# Patient Record
Sex: MALE | Race: WHITE | HISPANIC OR LATINO
[De-identification: names, ages, dates, MRNs, and addresses within clinical notes are randomized per-mention and may not be internally consistent; named-entity substitution may affect disease eponyms.]

---

## 2018-07-06 ENCOUNTER — TRANSCRIPTION ENCOUNTER (OUTPATIENT)
Age: 33
End: 2018-07-06

## 2018-07-06 ENCOUNTER — INPATIENT (INPATIENT)
Facility: HOSPITAL | Age: 33
LOS: 1 days | Discharge: HOME | End: 2018-07-08
Attending: SURGERY | Admitting: SURGERY
Payer: COMMERCIAL

## 2018-07-06 VITALS
OXYGEN SATURATION: 100 % | WEIGHT: 145.06 LBS | SYSTOLIC BLOOD PRESSURE: 138 MMHG | HEIGHT: 68 IN | RESPIRATION RATE: 23 BRPM | DIASTOLIC BLOOD PRESSURE: 71 MMHG | TEMPERATURE: 98 F | HEART RATE: 62 BPM

## 2018-07-06 LAB
ALBUMIN SERPL ELPH-MCNC: 4.2 G/DL — SIGNIFICANT CHANGE UP (ref 3.3–5)
ALP SERPL-CCNC: 73 U/L — SIGNIFICANT CHANGE UP (ref 40–120)
ALT FLD-CCNC: 36 U/L — SIGNIFICANT CHANGE UP (ref 12–78)
ANION GAP SERPL CALC-SCNC: 8 MMOL/L — SIGNIFICANT CHANGE UP (ref 5–17)
APPEARANCE UR: CLEAR — SIGNIFICANT CHANGE UP
AST SERPL-CCNC: 18 U/L — SIGNIFICANT CHANGE UP (ref 15–37)
BASOPHILS # BLD AUTO: 0.03 K/UL — SIGNIFICANT CHANGE UP (ref 0–0.2)
BASOPHILS NFR BLD AUTO: 0.2 % — SIGNIFICANT CHANGE UP (ref 0–2)
BILIRUB SERPL-MCNC: 0.3 MG/DL — SIGNIFICANT CHANGE UP (ref 0.2–1.2)
BILIRUB UR-MCNC: NEGATIVE — SIGNIFICANT CHANGE UP
BUN SERPL-MCNC: 15 MG/DL — SIGNIFICANT CHANGE UP (ref 7–23)
CALCIUM SERPL-MCNC: 9.1 MG/DL — SIGNIFICANT CHANGE UP (ref 8.5–10.1)
CHLORIDE SERPL-SCNC: 103 MMOL/L — SIGNIFICANT CHANGE UP (ref 96–108)
CO2 SERPL-SCNC: 28 MMOL/L — SIGNIFICANT CHANGE UP (ref 22–31)
COLOR SPEC: YELLOW — SIGNIFICANT CHANGE UP
CREAT SERPL-MCNC: 1.16 MG/DL — SIGNIFICANT CHANGE UP (ref 0.5–1.3)
DIFF PNL FLD: NEGATIVE — SIGNIFICANT CHANGE UP
EOSINOPHIL # BLD AUTO: 0.07 K/UL — SIGNIFICANT CHANGE UP (ref 0–0.5)
EOSINOPHIL NFR BLD AUTO: 0.5 % — SIGNIFICANT CHANGE UP (ref 0–6)
GLUCOSE SERPL-MCNC: 97 MG/DL — SIGNIFICANT CHANGE UP (ref 70–99)
GLUCOSE UR QL: NEGATIVE MG/DL — SIGNIFICANT CHANGE UP
HCT VFR BLD CALC: 44.9 % — SIGNIFICANT CHANGE UP (ref 39–50)
HGB BLD-MCNC: 15 G/DL — SIGNIFICANT CHANGE UP (ref 13–17)
IMM GRANULOCYTES NFR BLD AUTO: 0.3 % — SIGNIFICANT CHANGE UP (ref 0–1.5)
KETONES UR-MCNC: NEGATIVE — SIGNIFICANT CHANGE UP
LEUKOCYTE ESTERASE UR-ACNC: NEGATIVE — SIGNIFICANT CHANGE UP
LIDOCAIN IGE QN: 166 U/L — SIGNIFICANT CHANGE UP (ref 73–393)
LYMPHOCYTES # BLD AUTO: 23 % — SIGNIFICANT CHANGE UP (ref 13–44)
LYMPHOCYTES # BLD AUTO: 3.19 K/UL — SIGNIFICANT CHANGE UP (ref 1–3.3)
MCHC RBC-ENTMCNC: 30.2 PG — SIGNIFICANT CHANGE UP (ref 27–34)
MCHC RBC-ENTMCNC: 33.4 GM/DL — SIGNIFICANT CHANGE UP (ref 32–36)
MCV RBC AUTO: 90.3 FL — SIGNIFICANT CHANGE UP (ref 80–100)
MONOCYTES # BLD AUTO: 0.78 K/UL — SIGNIFICANT CHANGE UP (ref 0–0.9)
MONOCYTES NFR BLD AUTO: 5.6 % — SIGNIFICANT CHANGE UP (ref 2–14)
NEUTROPHILS # BLD AUTO: 9.75 K/UL — HIGH (ref 1.8–7.4)
NEUTROPHILS NFR BLD AUTO: 70.4 % — SIGNIFICANT CHANGE UP (ref 43–77)
NITRITE UR-MCNC: NEGATIVE — SIGNIFICANT CHANGE UP
NRBC # BLD: 0 /100 WBCS — SIGNIFICANT CHANGE UP (ref 0–0)
PH UR: 7 — SIGNIFICANT CHANGE UP (ref 5–8)
PLATELET # BLD AUTO: 232 K/UL — SIGNIFICANT CHANGE UP (ref 150–400)
POTASSIUM SERPL-MCNC: 4.3 MMOL/L — SIGNIFICANT CHANGE UP (ref 3.5–5.3)
POTASSIUM SERPL-SCNC: 4.3 MMOL/L — SIGNIFICANT CHANGE UP (ref 3.5–5.3)
PROT SERPL-MCNC: 8 GM/DL — SIGNIFICANT CHANGE UP (ref 6–8.3)
PROT UR-MCNC: 15 MG/DL
RBC # BLD: 4.97 M/UL — SIGNIFICANT CHANGE UP (ref 4.2–5.8)
RBC # FLD: 12.2 % — SIGNIFICANT CHANGE UP (ref 10.3–14.5)
SODIUM SERPL-SCNC: 139 MMOL/L — SIGNIFICANT CHANGE UP (ref 135–145)
SP GR SPEC: 1.02 — SIGNIFICANT CHANGE UP (ref 1.01–1.02)
UROBILINOGEN FLD QL: NEGATIVE MG/DL — SIGNIFICANT CHANGE UP
WBC # BLD: 13.86 K/UL — HIGH (ref 3.8–10.5)
WBC # FLD AUTO: 13.86 K/UL — HIGH (ref 3.8–10.5)

## 2018-07-06 PROCEDURE — 99285 EMERGENCY DEPT VISIT HI MDM: CPT

## 2018-07-06 RX ORDER — SODIUM CHLORIDE 9 MG/ML
1000 INJECTION INTRAMUSCULAR; INTRAVENOUS; SUBCUTANEOUS ONCE
Qty: 0 | Refills: 0 | Status: COMPLETED | OUTPATIENT
Start: 2018-07-06 | End: 2018-07-06

## 2018-07-06 RX ORDER — IOHEXOL 300 MG/ML
30 INJECTION, SOLUTION INTRAVENOUS ONCE
Qty: 0 | Refills: 0 | Status: COMPLETED | OUTPATIENT
Start: 2018-07-06 | End: 2018-07-06

## 2018-07-06 RX ORDER — MORPHINE SULFATE 50 MG/1
4 CAPSULE, EXTENDED RELEASE ORAL ONCE
Qty: 0 | Refills: 0 | Status: DISCONTINUED | OUTPATIENT
Start: 2018-07-06 | End: 2018-07-06

## 2018-07-06 RX ADMIN — MORPHINE SULFATE 4 MILLIGRAM(S): 50 CAPSULE, EXTENDED RELEASE ORAL at 22:05

## 2018-07-06 RX ADMIN — IOHEXOL 30 MILLILITER(S): 300 INJECTION, SOLUTION INTRAVENOUS at 22:08

## 2018-07-06 RX ADMIN — MORPHINE SULFATE 4 MILLIGRAM(S): 50 CAPSULE, EXTENDED RELEASE ORAL at 23:14

## 2018-07-06 RX ADMIN — SODIUM CHLORIDE 1000 MILLILITER(S): 9 INJECTION INTRAMUSCULAR; INTRAVENOUS; SUBCUTANEOUS at 22:08

## 2018-07-06 NOTE — ED PROVIDER NOTE - OBJECTIVE STATEMENT
Pt is a 34 yo gentleman with no significant past medical history who presents to the ED with abdominal pain starting 45 min prior to arrival. No n/v/d, no chest pain, no sob. Pain is constant. Localized to RLQ. No radiation, no testicular pain, no dysuria, no flank pain. No hx of kidney stones, no hx of abd surgeries.

## 2018-07-06 NOTE — ED PROVIDER NOTE - MEDICAL DECISION MAKING DETAILS
Ddx: Appendicitis/ Kidney stone/ no testicular pain to suggest torsion  Plan: cbc, cmp, pain control, ct abd, reassess

## 2018-07-06 NOTE — ED ADULT TRIAGE NOTE - CHIEF COMPLAINT QUOTE
Pt is here for right lower quadrant pain. Pt states that the pain was sudden in onset and started about 45 minutes ago.

## 2018-07-07 ENCOUNTER — RESULT REVIEW (OUTPATIENT)
Age: 33
End: 2018-07-07

## 2018-07-07 DIAGNOSIS — K35.80 UNSPECIFIED ACUTE APPENDICITIS: ICD-10-CM

## 2018-07-07 LAB
APTT BLD: 27.7 SEC — SIGNIFICANT CHANGE UP (ref 27.5–37.4)
INR BLD: 1.2 RATIO — HIGH (ref 0.88–1.16)
PROTHROM AB SERPL-ACNC: 13.1 SEC — HIGH (ref 9.8–12.7)
WBC UR QL: SIGNIFICANT CHANGE UP

## 2018-07-07 PROCEDURE — 88304 TISSUE EXAM BY PATHOLOGIST: CPT | Mod: 26

## 2018-07-07 PROCEDURE — 44970 LAPAROSCOPY APPENDECTOMY: CPT | Mod: AS

## 2018-07-07 PROCEDURE — 74177 CT ABD & PELVIS W/CONTRAST: CPT | Mod: 26

## 2018-07-07 RX ORDER — ACETAMINOPHEN 500 MG
1000 TABLET ORAL ONCE
Qty: 0 | Refills: 0 | Status: COMPLETED | OUTPATIENT
Start: 2018-07-07 | End: 2018-07-07

## 2018-07-07 RX ORDER — HYDROMORPHONE HYDROCHLORIDE 2 MG/ML
1 INJECTION INTRAMUSCULAR; INTRAVENOUS; SUBCUTANEOUS
Qty: 0 | Refills: 0 | Status: DISCONTINUED | OUTPATIENT
Start: 2018-07-07 | End: 2018-07-08

## 2018-07-07 RX ORDER — CEFOTETAN DISODIUM 1 G
VIAL (EA) INJECTION
Qty: 0 | Refills: 0 | Status: DISCONTINUED | OUTPATIENT
Start: 2018-07-07 | End: 2018-07-07

## 2018-07-07 RX ORDER — SODIUM CHLORIDE 9 MG/ML
1000 INJECTION, SOLUTION INTRAVENOUS
Qty: 0 | Refills: 0 | Status: DISCONTINUED | OUTPATIENT
Start: 2018-07-07 | End: 2018-07-08

## 2018-07-07 RX ORDER — CEFOTETAN DISODIUM 1 G
2 VIAL (EA) INJECTION EVERY 12 HOURS
Qty: 0 | Refills: 0 | Status: COMPLETED | OUTPATIENT
Start: 2018-07-07 | End: 2018-07-07

## 2018-07-07 RX ORDER — ONDANSETRON 8 MG/1
4 TABLET, FILM COATED ORAL EVERY 6 HOURS
Qty: 0 | Refills: 0 | Status: DISCONTINUED | OUTPATIENT
Start: 2018-07-07 | End: 2018-07-07

## 2018-07-07 RX ORDER — HEPARIN SODIUM 5000 [USP'U]/ML
5000 INJECTION INTRAVENOUS; SUBCUTANEOUS EVERY 12 HOURS
Qty: 0 | Refills: 0 | Status: DISCONTINUED | OUTPATIENT
Start: 2018-07-07 | End: 2018-07-08

## 2018-07-07 RX ORDER — CEFOTETAN DISODIUM 1 G
2 VIAL (EA) INJECTION EVERY 12 HOURS
Qty: 0 | Refills: 0 | Status: DISCONTINUED | OUTPATIENT
Start: 2018-07-07 | End: 2018-07-07

## 2018-07-07 RX ORDER — OXYCODONE AND ACETAMINOPHEN 5; 325 MG/1; MG/1
2 TABLET ORAL EVERY 4 HOURS
Qty: 0 | Refills: 0 | Status: DISCONTINUED | OUTPATIENT
Start: 2018-07-07 | End: 2018-07-08

## 2018-07-07 RX ORDER — MORPHINE SULFATE 50 MG/1
4 CAPSULE, EXTENDED RELEASE ORAL EVERY 4 HOURS
Qty: 0 | Refills: 0 | Status: DISCONTINUED | OUTPATIENT
Start: 2018-07-07 | End: 2018-07-07

## 2018-07-07 RX ORDER — HEPARIN SODIUM 5000 [USP'U]/ML
5000 INJECTION INTRAVENOUS; SUBCUTANEOUS EVERY 12 HOURS
Qty: 0 | Refills: 0 | Status: DISCONTINUED | OUTPATIENT
Start: 2018-07-07 | End: 2018-07-07

## 2018-07-07 RX ORDER — FENTANYL CITRATE 50 UG/ML
50 INJECTION INTRAVENOUS
Qty: 0 | Refills: 0 | Status: DISCONTINUED | OUTPATIENT
Start: 2018-07-07 | End: 2018-07-08

## 2018-07-07 RX ORDER — CEFOTETAN DISODIUM 1 G
2 VIAL (EA) INJECTION ONCE
Qty: 0 | Refills: 0 | Status: COMPLETED | OUTPATIENT
Start: 2018-07-07 | End: 2018-07-07

## 2018-07-07 RX ORDER — MORPHINE SULFATE 50 MG/1
2 CAPSULE, EXTENDED RELEASE ORAL EVERY 4 HOURS
Qty: 0 | Refills: 0 | Status: DISCONTINUED | OUTPATIENT
Start: 2018-07-07 | End: 2018-07-08

## 2018-07-07 RX ORDER — SODIUM CHLORIDE 9 MG/ML
1000 INJECTION, SOLUTION INTRAVENOUS
Qty: 0 | Refills: 0 | Status: DISCONTINUED | OUTPATIENT
Start: 2018-07-07 | End: 2018-07-07

## 2018-07-07 RX ORDER — ACETAMINOPHEN 500 MG
650 TABLET ORAL EVERY 6 HOURS
Qty: 0 | Refills: 0 | Status: DISCONTINUED | OUTPATIENT
Start: 2018-07-07 | End: 2018-07-07

## 2018-07-07 RX ADMIN — HEPARIN SODIUM 5000 UNIT(S): 5000 INJECTION INTRAVENOUS; SUBCUTANEOUS at 18:52

## 2018-07-07 RX ADMIN — SODIUM CHLORIDE 125 MILLILITER(S): 9 INJECTION, SOLUTION INTRAVENOUS at 03:33

## 2018-07-07 RX ADMIN — Medication 100 GRAM(S): at 02:58

## 2018-07-07 RX ADMIN — SODIUM CHLORIDE 100 MILLILITER(S): 9 INJECTION, SOLUTION INTRAVENOUS at 11:50

## 2018-07-07 RX ADMIN — Medication 1000 MILLIGRAM(S): at 12:00

## 2018-07-07 RX ADMIN — Medication 400 MILLIGRAM(S): at 11:40

## 2018-07-07 RX ADMIN — Medication 50 GRAM(S): at 18:51

## 2018-07-07 NOTE — H&P ADULT - NSHPREVIEWOFSYSTEMS_GEN_ALL_CORE
· CONSTITUTIONAL: no fever and no chills.  · EYES: no discharge, no irritation, no pain, no redness, and no visual changes.  · ENMT: Ears: no ear pain and no hearing problems. Nose: no nasal congestion and no nasal drainage. Mouth/Throat: no dysphagia, no hoarseness and no throat pain. Neck: no lumps, no pain, no stiffness and no swollen glands.  · CARDIOVASCULAR: no chest pain and no edema.  · RESPIRATORY: no chest pain, no cough, and no shortness of breath.  · GASTROINTESTINAL: - - -  · Gastrointestinal [+]: ABDOMINAL PAIN  · MUSCULOSKELETAL: no back pain, no gout, no musculoskeletal pain, no neck pain, and no weakness.  · SKIN: no abrasions, no jaundice, no lesions, no pruritis, and no rashes.  · NEURO: no loss of consciousness, no gait abnormality, no headache, no sensory deficits, and no weakness.  · PSYCHIATRIC: no known mental health issues.

## 2018-07-07 NOTE — H&P ADULT - NSHPPHYSICALEXAM_GEN_ALL_CORE
· CONSTITUTIONAL: Well appearing, well nourished, awake, alert, oriented to person, place, time/situation and in no apparent distress.  · ENMT: Airway patent, Nasal mucosa clear. Mouth with normal mucosa. Throat has no vesicles, no oropharyngeal exudates and uvula is midline.  · EYES: Clear bilaterally, pupils equal, round and reactive to light.  · CARDIAC: Normal rate, regular rhythm.  Heart sounds S1, S2.  No murmurs, rubs or gallops.  · RESPIRATORY: Breath sounds clear and equal bilaterally.  · ABDOMEN:L: ND, +BS, Mcburney point tenderness, +guarding, Positive Rovsing sign, +rebound  · GENITOURINARY: No discharge, lesions. No testicular pain  · SKIN: Skin normal color for race, warm, dry and intact. No evidence of rash.

## 2018-07-07 NOTE — H&P ADULT - HISTORY OF PRESENT ILLNESS
34y/o male with no significant PMH or PSH presents to ER with c/o periumbilical pain that started last evening. Pt states pain is 10/10, sudden onset, started periumbilical and has now localized to RLQ. Denies  n/v/d, no chest pain, no sob,  no testicular pain, no dysuria, no flank pain. No hx of kidney stones, no hx of abd surgeries. Last Bm was yesterday morning, last meal was yesterday lunch.

## 2018-07-07 NOTE — H&P ADULT - NSHPSOCIALHISTORY_GEN_ALL_CORE
Marries, lives with spouse and children, , denies smoking, alcohol socially, no other recreational drug use.

## 2018-07-07 NOTE — BRIEF OPERATIVE NOTE - PROCEDURE
<<-----Click on this checkbox to enter Procedure Laparoscopic appendectomy  07/07/2018    Active  SHRADDHA

## 2018-07-08 ENCOUNTER — TRANSCRIPTION ENCOUNTER (OUTPATIENT)
Age: 33
End: 2018-07-08

## 2018-07-08 VITALS
DIASTOLIC BLOOD PRESSURE: 72 MMHG | SYSTOLIC BLOOD PRESSURE: 113 MMHG | HEART RATE: 78 BPM | OXYGEN SATURATION: 95 % | RESPIRATION RATE: 16 BRPM | TEMPERATURE: 98 F

## 2018-07-08 LAB
ANION GAP SERPL CALC-SCNC: 5 MMOL/L — SIGNIFICANT CHANGE UP (ref 5–17)
BUN SERPL-MCNC: 12 MG/DL — SIGNIFICANT CHANGE UP (ref 7–23)
CALCIUM SERPL-MCNC: 8.4 MG/DL — LOW (ref 8.5–10.1)
CHLORIDE SERPL-SCNC: 106 MMOL/L — SIGNIFICANT CHANGE UP (ref 96–108)
CO2 SERPL-SCNC: 31 MMOL/L — SIGNIFICANT CHANGE UP (ref 22–31)
CREAT SERPL-MCNC: 1.05 MG/DL — SIGNIFICANT CHANGE UP (ref 0.5–1.3)
CULTURE RESULTS: SIGNIFICANT CHANGE UP
GLUCOSE SERPL-MCNC: 90 MG/DL — SIGNIFICANT CHANGE UP (ref 70–99)
HCT VFR BLD CALC: 41.4 % — SIGNIFICANT CHANGE UP (ref 39–50)
HGB BLD-MCNC: 13.8 G/DL — SIGNIFICANT CHANGE UP (ref 13–17)
MCHC RBC-ENTMCNC: 30.7 PG — SIGNIFICANT CHANGE UP (ref 27–34)
MCHC RBC-ENTMCNC: 33.3 GM/DL — SIGNIFICANT CHANGE UP (ref 32–36)
MCV RBC AUTO: 92 FL — SIGNIFICANT CHANGE UP (ref 80–100)
NRBC # BLD: 0 /100 WBCS — SIGNIFICANT CHANGE UP (ref 0–0)
PLATELET # BLD AUTO: 208 K/UL — SIGNIFICANT CHANGE UP (ref 150–400)
POTASSIUM SERPL-MCNC: 4.3 MMOL/L — SIGNIFICANT CHANGE UP (ref 3.5–5.3)
POTASSIUM SERPL-SCNC: 4.3 MMOL/L — SIGNIFICANT CHANGE UP (ref 3.5–5.3)
RBC # BLD: 4.5 M/UL — SIGNIFICANT CHANGE UP (ref 4.2–5.8)
RBC # FLD: 12.4 % — SIGNIFICANT CHANGE UP (ref 10.3–14.5)
SODIUM SERPL-SCNC: 142 MMOL/L — SIGNIFICANT CHANGE UP (ref 135–145)
SPECIMEN SOURCE: SIGNIFICANT CHANGE UP
WBC # BLD: 11.8 K/UL — HIGH (ref 3.8–10.5)
WBC # FLD AUTO: 11.8 K/UL — HIGH (ref 3.8–10.5)

## 2018-07-08 RX ORDER — DOCUSATE SODIUM 100 MG
1 CAPSULE ORAL
Qty: 20 | Refills: 0 | OUTPATIENT
Start: 2018-07-08 | End: 2018-07-17

## 2018-07-08 NOTE — DISCHARGE NOTE ADULT - HOSPITAL COURSE
33M admitted with acute appendicitis. Underwent a laparoscopic appendectomy on 7/7/18, no acute intraop event, patient tolerated procedure well. Patient did well postoperatively, tolerated advancement of diet, pain adequately controlled, and patient is voiding and ambulating without difficulty. Labs and vitals remain stable, hospital course uneventful.   Patient was discharged in stable condition on POD #1 to follow up with surgeon in 1-2 weeks.

## 2018-07-08 NOTE — DISCHARGE NOTE ADULT - PATIENT PORTAL LINK FT
You can access the [facility] Patient Portal, offered by [facility], by registering with the following website: [email or web address]

## 2018-07-08 NOTE — DISCHARGE NOTE ADULT - CARE PLAN
Principal Discharge DX:	Acute appendicitis, unspecified acute appendicitis type  Goal:	recover from surgery  Assessment and plan of treatment:	OK to shower, allow water to run over area and pat dry, leave steri strips in place and allow them fall off by themselves. May use OTC motrin or tylenol as needed for mild-moderate pain, or prescribed pain medication as directed. Do not drive while using prescribed narcotic pain meds. May use stool softener while taking prescribed pain medication to prevent constipation. Drink plenty of fluids, activity as tolerated, do not lift anything greater than 10lbs, and rest as needed. Please call the office to make an appointment for follow up with Dr. Barger in 10-14 days. Call MD sooner with any questions, concerns/problems.

## 2018-07-08 NOTE — CHART NOTE - NSCHARTNOTEFT_GEN_A_CORE
To Whom this may concern,    Mr. Michael Martinez was admitted to our facility on 7/6/18 and discharged on 7/8/18. Patient will be out of work for the next 7 days, may return to work once cleared by physician.    Do not hesitate to call with any questions.  Thank you in advance,    Emily Phillips PA-C  Department of Surgery  [facility]

## 2018-07-08 NOTE — DISCHARGE NOTE ADULT - CARE PROVIDER_API CALL
Maria C Barger), Surgery  [address] [facility]  [address] [place] [address]  Phone: [phone]  Fax: [phone]

## 2018-07-08 NOTE — DISCHARGE NOTE ADULT - NS AS ACTIVITY OBS
Stairs allowed/No Heavy lifting/straining/Showering allowed/Walking-Outdoors allowed/Do not drive or operate machinery/Walking-Indoors allowed

## 2018-07-08 NOTE — DISCHARGE NOTE ADULT - MEDICATION SUMMARY - MEDICATIONS TO TAKE
I will START or STAY ON the medications listed below when I get home from the hospital:    oxyCODONE-acetaminophen 5 mg-325 mg oral tablet  -- 1 tab(s) by mouth every 6 hours, As Needed -for moderate pain MDD:4   -- Caution federal law prohibits the transfer of this drug to any person other  than the person for whom it was prescribed.  May cause drowsiness.  Alcohol may intensify this effect.  Use care when operating dangerous machinery.  This prescription cannot be refilled.  This product contains acetaminophen.  Do not use  with any other product containing acetaminophen to prevent possible liver damage.  Using more of this medication than prescribed may cause serious breathing problems.    -- Indication: For postop pain    Colace 100 mg oral capsule  -- 1 cap(s) by mouth 2 times a day, As Needed -for constipation   -- Medication should be taken with plenty of water.    -- Indication: For contispation

## 2018-07-08 NOTE — PROGRESS NOTE ADULT - SUBJECTIVE AND OBJECTIVE BOX
Postoperative Day #: 1  Patient seen and examined at bedside resting comfortably. No acute issues overnight, Afebrile.   No complaints offered. No BM, +flatus. Voided without issues. Uses IS   Abdominal pain is well controlled.  Denies nausea and vomiting. Tolerating diet.  Denies chest pain, dyspnea, cough.    T(F): 98.5 (07-08-18 @ 03:45), Max: 98.8 (07-07-18 @ 09:41)  HR: 61 (07-08-18 @ 03:45) (55 - 83)  BP: 105/52 (07-08-18 @ 03:45) (95/52 - 127/86)  RR: 16 (07-08-18 @ 03:45) (12 - 18)  SpO2: 98% (07-08-18 @ 03:45) (97% - 100%)  Wt(kg): --  CAPILLARY BLOOD GLUCOSE      PHYSICAL EXAM:  General: NAD, WDWN  Neuro:  Alert & oriented x 3  HEENT: NCAT, EOMI, conjunctiva clear  CV: +S1+S2 regular rate and rhythm  Lung: clear to ausculation bilaterally, respirations nonlabored, good inspiratory effort  Abdomen: soft, mildly ND. Normoactive BS, +incisional tenderness, incisions with dressing c/d/i.  Extremities: no pedal edema or calf tenderness noted     LABS: Pending                  07 Jul 2018 07:01  -  08 Jul 2018 06:13  --------------------------------------------------------  IN:    Oral Fluid: 780 mL  Total IN: 780 mL    OUT:  Total OUT: 0 mL    Total NET: 780 mL            Impression: 33y Male S/P lap Appy -POD#1 Stable      Plan:  - Advance diet as tolerated  - F/U am labs  -continue VTE prophylaxis   -Increase activity with PT, OOB, Ambulate  -Cont incentive spirometry use  -continue local wound care  - possible d/c home in PM  -will discuss with surgical attending

## 2018-07-08 NOTE — DISCHARGE NOTE ADULT - PLAN OF CARE
recover from surgery OK to shower, allow water to run over area and pat dry, leave steri strips in place and allow them fall off by themselves. May use OTC motrin or tylenol as needed for mild-moderate pain, or prescribed pain medication as directed. Do not drive while using prescribed narcotic pain meds. May use stool softener while taking prescribed pain medication to prevent constipation. Drink plenty of fluids, activity as tolerated, do not lift anything greater than 10lbs, and rest as needed. Please call the office to make an appointment for follow up with Dr. Barger in 10-14 days. Call MD sooner with any questions, concerns/problems.

## 2018-07-10 LAB — SURGICAL PATHOLOGY FINAL REPORT - CH: SIGNIFICANT CHANGE UP

## 2018-07-16 DIAGNOSIS — K35.80 UNSPECIFIED ACUTE APPENDICITIS: ICD-10-CM

## 2018-07-16 DIAGNOSIS — K59.00 CONSTIPATION, UNSPECIFIED: ICD-10-CM

## 2018-07-16 DIAGNOSIS — R10.9 UNSPECIFIED ABDOMINAL PAIN: ICD-10-CM

## 2019-02-11 PROBLEM — Z00.00 ENCOUNTER FOR PREVENTIVE HEALTH EXAMINATION: Status: ACTIVE | Noted: 2019-02-11

## 2019-02-28 ENCOUNTER — APPOINTMENT (OUTPATIENT)
Dept: UROLOGY | Facility: CLINIC | Age: 34
End: 2019-02-28

## 2019-03-11 ENCOUNTER — APPOINTMENT (OUTPATIENT)
Dept: UROLOGY | Facility: CLINIC | Age: 34
End: 2019-03-11
Payer: COMMERCIAL

## 2019-03-11 VITALS
HEART RATE: 62 BPM | SYSTOLIC BLOOD PRESSURE: 109 MMHG | TEMPERATURE: 97.8 F | WEIGHT: 160 LBS | DIASTOLIC BLOOD PRESSURE: 71 MMHG | BODY MASS INDEX: 24.25 KG/M2 | HEIGHT: 68 IN | RESPIRATION RATE: 16 BRPM

## 2019-03-11 DIAGNOSIS — R31.0 GROSS HEMATURIA: ICD-10-CM

## 2019-03-11 DIAGNOSIS — R39.9 UNSPECIFIED SYMPTOMS AND SIGNS INVOLVING THE GENITOURINARY SYSTEM: ICD-10-CM

## 2019-03-11 DIAGNOSIS — Z12.5 ENCOUNTER FOR SCREENING FOR MALIGNANT NEOPLASM OF PROSTATE: ICD-10-CM

## 2019-03-11 LAB
ALBUMIN SERPL ELPH-MCNC: 4.5 G/DL
ALP BLD-CCNC: 56 U/L
ALT SERPL-CCNC: 22 U/L
ANION GAP SERPL CALC-SCNC: 13 MMOL/L
APPEARANCE: ABNORMAL
AST SERPL-CCNC: 17 U/L
BACTERIA: NEGATIVE
BASOPHILS # BLD AUTO: 0.03 K/UL
BASOPHILS NFR BLD AUTO: 0.8 %
BILIRUB SERPL-MCNC: 0.3 MG/DL
BILIRUBIN URINE: NEGATIVE
BLOOD URINE: NEGATIVE
BUN SERPL-MCNC: 12 MG/DL
CALCIUM SERPL-MCNC: 9.6 MG/DL
CHLORIDE SERPL-SCNC: 102 MMOL/L
CO2 SERPL-SCNC: 26 MMOL/L
COLOR: YELLOW
CREAT SERPL-MCNC: 0.95 MG/DL
EOSINOPHIL # BLD AUTO: 0.05 K/UL
EOSINOPHIL NFR BLD AUTO: 1.3 %
GLUCOSE QUALITATIVE U: NEGATIVE
GLUCOSE SERPL-MCNC: 87 MG/DL
HCT VFR BLD CALC: 46.2 %
HGB BLD-MCNC: 14.7 G/DL
HYALINE CASTS: 2 /LPF
IMM GRANULOCYTES NFR BLD AUTO: 0.5 %
KETONES URINE: NEGATIVE
LEUKOCYTE ESTERASE URINE: NEGATIVE
LYMPHOCYTES # BLD AUTO: 1.73 K/UL
LYMPHOCYTES NFR BLD AUTO: 43.4 %
MAN DIFF?: NORMAL
MCHC RBC-ENTMCNC: 30.1 PG
MCHC RBC-ENTMCNC: 31.8 GM/DL
MCV RBC AUTO: 94.7 FL
MICROSCOPIC-UA: NORMAL
MONOCYTES # BLD AUTO: 0.29 K/UL
MONOCYTES NFR BLD AUTO: 7.3 %
NEUTROPHILS # BLD AUTO: 1.87 K/UL
NEUTROPHILS NFR BLD AUTO: 46.7 %
NITRITE URINE: NEGATIVE
PH URINE: 8
PLATELET # BLD AUTO: 236 K/UL
POTASSIUM SERPL-SCNC: 4.6 MMOL/L
PROT SERPL-MCNC: 7.1 G/DL
PROTEIN URINE: NORMAL
PSA SERPL-MCNC: 0.43 NG/ML
RBC # BLD: 4.88 M/UL
RBC # FLD: 12 %
RED BLOOD CELLS URINE: 4 /HPF
SODIUM SERPL-SCNC: 141 MMOL/L
SPECIFIC GRAVITY URINE: 1.02
SQUAMOUS EPITHELIAL CELLS: 2 /HPF
TESTOST SERPL-MCNC: 792 NG/DL
UROBILINOGEN URINE: NORMAL
WBC # FLD AUTO: 3.99 K/UL
WHITE BLOOD CELLS URINE: 2 /HPF

## 2019-03-11 PROCEDURE — 99244 OFF/OP CNSLTJ NEW/EST MOD 40: CPT

## 2019-03-11 NOTE — ADDENDUM
[FreeTextEntry1] : This note was authored by Kori Florian working as a scribe for Dr. Troy Paz.\par I, Dr. Troy Paz, have reviewed the content of this note and confirm it is true and accurate. I personally performed the history and physical examination and made all the decisions.\par 3/11/19\par

## 2019-03-11 NOTE — PHYSICAL EXAM
[General Appearance - Well Developed] : well developed [General Appearance - Well Nourished] : well nourished [Normal Appearance] : normal appearance [Well Groomed] : well groomed [General Appearance - In No Acute Distress] : no acute distress [Heart Rate And Rhythm] : Heart rate and rhythm were normal [Edema] : no peripheral edema [Respiration, Rhythm And Depth] : normal respiratory rhythm and effort [Exaggerated Use Of Accessory Muscles For Inspiration] : no accessory muscle use [Abdomen Soft] : soft [Abdomen Tenderness] : non-tender [Costovertebral Angle Tenderness] : no ~M costovertebral angle tenderness [Normal Station and Gait] : the gait and station were normal for the patient's age [Skin Color & Pigmentation] : normal skin color and pigmentation [Skin Turgor] : supple [] : no rash [No Focal Deficits] : no focal deficits [Oriented To Time, Place, And Person] : oriented to person, place, and time [Affect] : the affect was normal [Mood] : the mood was normal [Not Anxious] : not anxious [No Palpable Adenopathy] : no palpable adenopathy [Cervical Lymph Nodes Enlarged Posterior Bilaterally] : posterior cervical [Cervical Lymph Nodes Enlarged Anterior Bilaterally] : anterior cervical [Supraclavicular Lymph Nodes Enlarged Bilaterally] : supraclavicular [Urethral Meatus] : meatus normal [Penis Abnormality] : normal circumcised penis [Testes Tenderness] : no tenderness of the testes [Testes Mass (___cm)] : there were no testicular masses [FreeTextEntry1] : .

## 2019-03-11 NOTE — HISTORY OF PRESENT ILLNESS
[FreeTextEntry1] : Mr. Martinez is a 34 year old male presenting with blood from tip of penis after sex. Notes that he had sex with his wife one week ago. When he went into the shower after sex, he saw blood come out of his penis. Denies hematospermia and gross hematuria. Denies pain when he sits. Was given an antibiotic by his PCP just in case he had an infection. Notes that this was the only episode where he had blood from his penis after sex. He has had sex since that episode without any issues. Patient denies dysuria, pushing or straining, urgency, or incontinence. Doesn't wake up during the night to urinate. Denies personal history of kidney stones, tobacco use, asthma and diabetes. Denies family history of kidney stones and prostate cancer. Had a hydrocele when he was a child. Had an appendicitis in July 2018. NKDA. . , has two kids and trying to have another.

## 2019-03-11 NOTE — ASSESSMENT
[FreeTextEntry1] : Mr. Martinez is a 34 year old male presenting with blood from tip of penis after sex. Notes that he had sex with his wife one week ago. When he went into the shower after sex, he saw blood come out of his penis. Denies hematospermia and gross hematuria. Denies pain when he sits. Was given an antibiotic by his PCP just in case he had an infection. Notes that this was the only episode where he had blood from his penis after sex. He has had sex since that episode without any issues. Patient denies dysuria, pushing or straining, urgency, or incontinence. Doesn't wake up during the night to urinate. Denies personal history of kidney stones, tobacco use, asthma and diabetes. Denies family history of kidney stones and prostate cancer. Had a hydrocele when he was a child. Had an appendicitis in July 2018. NKDA. . , has two kids and trying to have another.\par \par The patient produced a urine sample which will be sent for urinalysis, urine cytology, and urine culture.\par Blood work today included comprehensive metabolic panel, CBC, PSA, and testosterone.\par \par The patient will undergo a MRI pelvis.\par \par The patient will return one week after the MRI for a follow up.

## 2019-03-11 NOTE — REVIEW OF SYSTEMS
[Negative] : Heme/Lymph [Feeling Poorly] : not feeling poorly [Eyesight Problems] : no eyesight problems [Loss Of Hearing] : no hearing loss [Nosebleeds] : no nosebleeds [Chest Pain] : no chest pain [Cough] : no cough [Constipation] : no constipation [Dysuria] : no dysuria [Incontinence] : no incontinence [Nocturia] : no nocturia [Arthralgias] : no arthralgias [Joint Pain] : no joint pain [Skin Lesions] : no skin lesions [Skin Wound] : no skin wound [Convulsions] : no convulsions [Anxiety] : no anxiety [Depression] : no depression [Erectile Dysfunction] : no erectile dysfunction [Easy Bleeding] : no tendency for easy bleeding [Easy Bruising] : no tendency for easy bruising

## 2019-03-12 ENCOUNTER — OTHER (OUTPATIENT)
Age: 34
End: 2019-03-12

## 2019-03-12 LAB
BACTERIA UR CULT: NORMAL
URINE CYTOLOGY: NORMAL

## 2019-03-18 ENCOUNTER — OTHER (OUTPATIENT)
Age: 34
End: 2019-03-18

## 2019-03-21 ENCOUNTER — APPOINTMENT (OUTPATIENT)
Dept: MRI IMAGING | Facility: IMAGING CENTER | Age: 34
End: 2019-03-21

## 2019-04-04 ENCOUNTER — APPOINTMENT (OUTPATIENT)
Dept: UROLOGY | Facility: CLINIC | Age: 34
End: 2019-04-04
Payer: COMMERCIAL

## 2019-04-04 PROCEDURE — 99214 OFFICE O/P EST MOD 30 MIN: CPT

## 2020-09-01 ENCOUNTER — EMERGENCY (EMERGENCY)
Facility: HOSPITAL | Age: 35
LOS: 0 days | Discharge: HOME | End: 2020-09-01
Attending: EMERGENCY MEDICINE
Payer: COMMERCIAL

## 2020-09-01 VITALS
TEMPERATURE: 98 F | OXYGEN SATURATION: 100 % | RESPIRATION RATE: 16 BRPM | SYSTOLIC BLOOD PRESSURE: 95 MMHG | DIASTOLIC BLOOD PRESSURE: 65 MMHG | HEART RATE: 72 BPM

## 2020-09-01 VITALS
HEIGHT: 68 IN | TEMPERATURE: 98 F | SYSTOLIC BLOOD PRESSURE: 114 MMHG | OXYGEN SATURATION: 100 % | HEART RATE: 58 BPM | DIASTOLIC BLOOD PRESSURE: 69 MMHG | RESPIRATION RATE: 16 BRPM | WEIGHT: 145.06 LBS

## 2020-09-01 DIAGNOSIS — R11.2 NAUSEA WITH VOMITING, UNSPECIFIED: ICD-10-CM

## 2020-09-01 DIAGNOSIS — G43.109 MIGRAINE WITH AURA, NOT INTRACTABLE, WITHOUT STATUS MIGRAINOSUS: ICD-10-CM

## 2020-09-01 DIAGNOSIS — R20.0 ANESTHESIA OF SKIN: ICD-10-CM

## 2020-09-01 DIAGNOSIS — R51 HEADACHE: ICD-10-CM

## 2020-09-01 LAB
ALBUMIN SERPL ELPH-MCNC: 3.8 G/DL — SIGNIFICANT CHANGE UP (ref 3.3–5)
ALP SERPL-CCNC: 58 U/L — SIGNIFICANT CHANGE UP (ref 40–120)
ALT FLD-CCNC: 34 U/L — SIGNIFICANT CHANGE UP (ref 12–78)
ANION GAP SERPL CALC-SCNC: 3 MMOL/L — LOW (ref 5–17)
AST SERPL-CCNC: 17 U/L — SIGNIFICANT CHANGE UP (ref 15–37)
BASOPHILS # BLD AUTO: 0.02 K/UL — SIGNIFICANT CHANGE UP (ref 0–0.2)
BASOPHILS NFR BLD AUTO: 0.4 % — SIGNIFICANT CHANGE UP (ref 0–2)
BILIRUB SERPL-MCNC: 0.4 MG/DL — SIGNIFICANT CHANGE UP (ref 0.2–1.2)
BUN SERPL-MCNC: 10 MG/DL — SIGNIFICANT CHANGE UP (ref 7–23)
CALCIUM SERPL-MCNC: 9.1 MG/DL — SIGNIFICANT CHANGE UP (ref 8.5–10.1)
CHLORIDE SERPL-SCNC: 103 MMOL/L — SIGNIFICANT CHANGE UP (ref 96–108)
CO2 SERPL-SCNC: 30 MMOL/L — SIGNIFICANT CHANGE UP (ref 22–31)
CREAT SERPL-MCNC: 0.93 MG/DL — SIGNIFICANT CHANGE UP (ref 0.5–1.3)
EOSINOPHIL # BLD AUTO: 0.04 K/UL — SIGNIFICANT CHANGE UP (ref 0–0.5)
EOSINOPHIL NFR BLD AUTO: 0.9 % — SIGNIFICANT CHANGE UP (ref 0–6)
GLUCOSE SERPL-MCNC: 90 MG/DL — SIGNIFICANT CHANGE UP (ref 70–99)
HCT VFR BLD CALC: 46 % — SIGNIFICANT CHANGE UP (ref 39–50)
HGB BLD-MCNC: 15 G/DL — SIGNIFICANT CHANGE UP (ref 13–17)
HIV 1 & 2 AB SERPL IA.RAPID: SIGNIFICANT CHANGE UP
IMM GRANULOCYTES NFR BLD AUTO: 0.2 % — SIGNIFICANT CHANGE UP (ref 0–1.5)
LYMPHOCYTES # BLD AUTO: 1.39 K/UL — SIGNIFICANT CHANGE UP (ref 1–3.3)
LYMPHOCYTES # BLD AUTO: 30.7 % — SIGNIFICANT CHANGE UP (ref 13–44)
MCHC RBC-ENTMCNC: 30.1 PG — SIGNIFICANT CHANGE UP (ref 27–34)
MCHC RBC-ENTMCNC: 32.6 GM/DL — SIGNIFICANT CHANGE UP (ref 32–36)
MCV RBC AUTO: 92.2 FL — SIGNIFICANT CHANGE UP (ref 80–100)
MONOCYTES # BLD AUTO: 0.35 K/UL — SIGNIFICANT CHANGE UP (ref 0–0.9)
MONOCYTES NFR BLD AUTO: 7.7 % — SIGNIFICANT CHANGE UP (ref 2–14)
NEUTROPHILS # BLD AUTO: 2.72 K/UL — SIGNIFICANT CHANGE UP (ref 1.8–7.4)
NEUTROPHILS NFR BLD AUTO: 60.1 % — SIGNIFICANT CHANGE UP (ref 43–77)
NRBC # BLD: 0 /100 WBCS — SIGNIFICANT CHANGE UP (ref 0–0)
PLATELET # BLD AUTO: 224 K/UL — SIGNIFICANT CHANGE UP (ref 150–400)
POTASSIUM SERPL-MCNC: 4.6 MMOL/L — SIGNIFICANT CHANGE UP (ref 3.5–5.3)
POTASSIUM SERPL-SCNC: 4.6 MMOL/L — SIGNIFICANT CHANGE UP (ref 3.5–5.3)
PROT SERPL-MCNC: 7.7 GM/DL — SIGNIFICANT CHANGE UP (ref 6–8.3)
RBC # BLD: 4.99 M/UL — SIGNIFICANT CHANGE UP (ref 4.2–5.8)
RBC # FLD: 12.1 % — SIGNIFICANT CHANGE UP (ref 10.3–14.5)
SODIUM SERPL-SCNC: 136 MMOL/L — SIGNIFICANT CHANGE UP (ref 135–145)
WBC # BLD: 4.53 K/UL — SIGNIFICANT CHANGE UP (ref 3.8–10.5)
WBC # FLD AUTO: 4.53 K/UL — SIGNIFICANT CHANGE UP (ref 3.8–10.5)

## 2020-09-01 PROCEDURE — 72125 CT NECK SPINE W/O DYE: CPT | Mod: 26

## 2020-09-01 PROCEDURE — 99285 EMERGENCY DEPT VISIT HI MDM: CPT

## 2020-09-01 PROCEDURE — 70450 CT HEAD/BRAIN W/O DYE: CPT | Mod: 26

## 2020-09-01 RX ORDER — METOCLOPRAMIDE HCL 10 MG
10 TABLET ORAL ONCE
Refills: 0 | Status: COMPLETED | OUTPATIENT
Start: 2020-09-01 | End: 2020-09-01

## 2020-09-01 RX ORDER — CYCLOBENZAPRINE HYDROCHLORIDE 10 MG/1
10 TABLET, FILM COATED ORAL ONCE
Refills: 0 | Status: COMPLETED | OUTPATIENT
Start: 2020-09-01 | End: 2020-09-01

## 2020-09-01 RX ORDER — ACETAMINOPHEN 500 MG
975 TABLET ORAL ONCE
Refills: 0 | Status: COMPLETED | OUTPATIENT
Start: 2020-09-01 | End: 2020-09-01

## 2020-09-01 RX ORDER — METOCLOPRAMIDE HCL 10 MG
1 TABLET ORAL
Qty: 15 | Refills: 0
Start: 2020-09-01 | End: 2020-09-05

## 2020-09-01 RX ADMIN — Medication 10 MILLIGRAM(S): at 14:45

## 2020-09-01 RX ADMIN — Medication 975 MILLIGRAM(S): at 13:41

## 2020-09-01 NOTE — ED ADULT NURSE NOTE - NSIMPLEMENTINTERV_GEN_ALL_ED
Implemented All Universal Safety Interventions:  [place] to call system. Call bell, personal items and telephone within reach. Instruct patient to call for assistance. Room bathroom lighting operational. Non-slip footwear when patient is off stretcher. Physically safe environment: no spills, clutter or unnecessary equipment. Stretcher in lowest position, wheels locked, appropriate side rails in place.

## 2020-09-01 NOTE — ED PROVIDER NOTE - PATIENT PORTAL LINK FT
You can access the FollowMyHealth Patient Portal offered by [facility] by registering at the following website: [email or web address]. By joining [employer]’s FollowMyHealth portal, you will also be able to view your health information using other applications (apps) compatible with our system.

## 2020-09-01 NOTE — ED ADULT NURSE NOTE - OBJECTIVE STATEMENT
Patient received with complaints of numbness to both fingers  and watery eyes since Sunday then worsen today. Patient voiced to head, back of neck and vomited twice this AM.

## 2020-09-01 NOTE — ED ADULT TRIAGE NOTE - CHIEF COMPLAINT QUOTE
36 y/o male with no PMH. Presents to the ED with C/C of numbness to the finger tips and calf muscle pain. On Sunday pt felt numbness to the fingertips and tongue. By Monday morning at 0930 pt woke up with numbness to the left arm, tongue, bilateral calf pain, and an  intermittent headache to the right side of the head, with neck pain. 2 episodes of vomiting and nausea. pt denies any chest pain, blurry vision, or numbness or tingling in the face, no difficulty speaking & no weakness. Today his symptoms are not as severe as the past days.

## 2020-09-01 NOTE — ED ADULT NURSE NOTE - CHIEF COMPLAINT QUOTE
34 y/o male with no PMH. Presents to the ED with C/C of numbness to the finger tips and calf muscle pain. On Sunday pt felt numbness to the fingertips and tongue. By Monday morning at 0930 pt woke up with numbness to the left arm, tongue, bilateral calf pain, and an  intermittent headache to the right side of the head, with neck pain. 2 episodes of vomiting and nausea. pt denies any chest pain, blurry vision, or numbness or tingling in the face, no difficulty speaking & no weakness. Today his symptoms are not as severe as the past days.

## 2020-09-01 NOTE — ED ADULT NURSE REASSESSMENT NOTE - NS ED NURSE REASSESS COMMENT FT1
Pt accepted for psych admission. Spoke to telepsych on the phone. Awaiting for placement and transportation.

## 2020-09-01 NOTE — ED PROVIDER NOTE - PROGRESS NOTE DETAILS
Pt is feeling well, symptoms resolved. CT negative, and headache resolved after reglan/ tylenol. Pt referred to neurology for f/u.

## 2020-09-01 NOTE — ED PROVIDER NOTE - OBJECTIVE STATEMENT
Pt is a 36 yo gentleman with no significant past medical history who presents to the ED with numbness and headache. This started Sunday, and he had a headache that was gradual, and had an aura of sensitivity to sound. Headache is worse on R. side, and has had sensitivity to light and sound, as well as nausea and vomiting. No fevers, no sudden onset. No neck stiffness, but has had some R. neck pain as well. Feels similar to prior headache he has had periodically through the years, no hx of formal migraine diagnosis. No trauma. Had some numbness to both of his hands. No weakness.

## 2020-09-01 NOTE — ED PROVIDER NOTE - CLINICAL SUMMARY MEDICAL DECISION MAKING FREE TEXT BOX
Ddx: Complex migraine/ no acute headache to suggest SAH, no fever to suggest meningitis/ ro cspine injury or impingement  Plan: CT head, cspine, reglan, tylenol, reassess

## 2020-10-17 ENCOUNTER — OUTPATIENT (OUTPATIENT)
Dept: OUTPATIENT SERVICES | Facility: HOSPITAL | Age: 35
LOS: 1 days | End: 2020-10-17
Payer: COMMERCIAL

## 2020-10-17 ENCOUNTER — APPOINTMENT (OUTPATIENT)
Dept: MRI IMAGING | Facility: IMAGING CENTER | Age: 35
End: 2020-10-17
Payer: COMMERCIAL

## 2020-10-17 DIAGNOSIS — R51.0 HEADACHE WITH ORTHOSTATIC COMPONENT, NOT ELSEWHERE CLASSIFIED: ICD-10-CM

## 2020-10-17 PROCEDURE — 72141 MRI NECK SPINE W/O DYE: CPT | Mod: 26

## 2020-10-17 PROCEDURE — 70551 MRI BRAIN STEM W/O DYE: CPT | Mod: 26

## 2020-10-17 PROCEDURE — 70551 MRI BRAIN STEM W/O DYE: CPT

## 2020-10-17 PROCEDURE — 72141 MRI NECK SPINE W/O DYE: CPT

## 2021-08-26 ENCOUNTER — APPOINTMENT (OUTPATIENT)
Dept: UROLOGY | Facility: CLINIC | Age: 36
End: 2021-08-26

## 2021-08-27 ENCOUNTER — APPOINTMENT (OUTPATIENT)
Dept: UROLOGY | Facility: CLINIC | Age: 36
End: 2021-08-27

## 2021-08-27 ENCOUNTER — APPOINTMENT (OUTPATIENT)
Dept: UROLOGY | Facility: CLINIC | Age: 36
End: 2021-08-27
Payer: COMMERCIAL

## 2021-08-27 DIAGNOSIS — N50.1 VASCULAR DISORDERS OF MALE GENITAL ORGANS: ICD-10-CM

## 2021-08-27 PROCEDURE — 99215 OFFICE O/P EST HI 40 MIN: CPT | Mod: 95

## 2021-08-27 NOTE — PHYSICAL EXAM
[General Appearance - Well Developed] : well developed [General Appearance - Well Nourished] : well nourished [Normal Appearance] : normal appearance [Well Groomed] : well groomed [General Appearance - In No Acute Distress] : no acute distress [Abdomen Soft] : soft [Abdomen Tenderness] : non-tender [Costovertebral Angle Tenderness] : no ~M costovertebral angle tenderness [Skin Color & Pigmentation] : normal skin color and pigmentation [Skin Turgor] : supple [Heart Rate And Rhythm] : Heart rate and rhythm were normal [Edema] : no peripheral edema [] : no respiratory distress [Respiration, Rhythm And Depth] : normal respiratory rhythm and effort [Exaggerated Use Of Accessory Muscles For Inspiration] : no accessory muscle use [Oriented To Time, Place, And Person] : oriented to person, place, and time [Affect] : the affect was normal [Mood] : the mood was normal [Not Anxious] : not anxious [Normal Station and Gait] : the gait and station were normal for the patient's age [No Focal Deficits] : no focal deficits [No Palpable Adenopathy] : no palpable adenopathy [Cervical Lymph Nodes Enlarged Posterior Bilaterally] : posterior cervical [Cervical Lymph Nodes Enlarged Anterior Bilaterally] : anterior cervical [Supraclavicular Lymph Nodes Enlarged Bilaterally] : supraclavicular [FreeTextEntry1] : Smile is symmetric. Tongue is midline. Hand  5/5 bilaterally.

## 2021-08-27 NOTE — ASSESSMENT
[FreeTextEntry1] : Mr. Martinez is a 34 year old male presented with blood from tip of penis after sex.\par 3/11/19: Noted that he had sex with his wife one week ago. When he went into the shower after sex, he saw blood come out of his penis. Denied hematospermia and gross hematuria. Denied pain when he sits. Was given an antibiotic by his PCP just in case he had an infection. Noted that this was the only episode where he had blood from his penis after sex. He has had sex since that episode without any issues. Patient denied dysuria, pushing or straining, urgency, or incontinence. Doesn't wake up during the night to urinate. Denied personal history of kidney stones, tobacco use, asthma and diabetes. Denied family history of kidney stones and prostate cancer. Had a hydrocele when he was a child. Had an appendicitis in July 2018. NKDA. . , has two kids and trying to have another.\par \par 4/4/19: The patient returned today and reported that the bleeding from the tip of his penis after sex resolved. Hasn't had any pain or urinary issues. His insurance denied the request for a MRI pelvis. I recommended a transrectal US of prostate, but the patient doesn't feel it is necessary, as his symptoms have resolved. We will revisit the idea in three months. To further his care, I recommended a JANNET and the patient was discomforted by the thought of it and declined. PSA from 3/11/19 was 0.43 ng/mL. Creatinine from 3/11/19 was 0.95 mg/dL. Notes that the patient is pregnant with his wife. Says this will be his last child and was curious about a vasectomy. I informed the patient that he should view the procedure as irreversible. . There is a 3 in 10,000 chance of spontaneous recanalization of the vas deferens with reappearance of sperm in semen. Vasectomies can be reversed surgically but restoration of fertility may not  occur.. I counseled him about the risks and benefits of a vasectomy. I informed him about the risk of infection and allergic reaction. Will consider this for the future, as he is interested.\par The patient will return in 3 months for a follow up.

## 2021-08-27 NOTE — ADDENDUM
[FreeTextEntry1] : This note was authored by Kori Florian working as a scribe for Dr. Troy Paz.\par I, Dr. Troy Paz, have reviewed the content of this note and confirm it is true and accurate. I personally performed the history and physical examination and made all the decisions.\par 4/4/19\par

## 2021-08-27 NOTE — HISTORY OF PRESENT ILLNESS
[FreeTextEntry1] : Mr. Martinez is a 34 year old male presented with blood from tip of penis after sex.\par 3/11/19: Noted that he had sex with his wife one week ago. When he went into the shower after sex, he saw blood come out of his penis. Denied hematospermia and gross hematuria. Denied pain when he sits. Was given an antibiotic by his PCP just in case he had an infection. Noted that this was the only episode where he had blood from his penis after sex. He has had sex since that episode without any issues. Patient denied dysuria, pushing or straining, urgency, or incontinence. Doesn't wake up during the night to urinate. Denied personal history of kidney stones, tobacco use, asthma and diabetes. Denied family history of kidney stones and prostate cancer. Had a hydrocele when he was a child. Had an appendicitis in July 2018. NKDA. . , has two kids and trying to have another.\par \par 4/4/19: The patient returned today and reported that the bleeding from the tip of his penis after sex resolved. Hasn't had any pain or urinary issues. His insurance denied the request for a MRI pelvis. I recommended a transrectal US of prostate, but the patient doesn't feel it is necessary, as his symptoms have resolved. We will revisit the idea in three months. To further his care, I recommended a JANNET and the patient was discomforted by the thought of it and declined. PSA from 3/11/19 was 0.43 ng/mL. Creatinine from 3/11/19 was 0.95 mg/dL. Notes that the patient is pregnant with his wife. Says this will be his last child and was curious about a vasectomy. I informed the patient that he should view the procedure as irreversible. There is a 3 in 10,000 chance of spontaneous recanalization of the vas deferens with reappearance of sperm imn semen. Vasectomies can be reversed surgically but restoration of fertility maay not  ocurr.. I counseled him about the risks and benefits of a vasectomy. I informed him about the risk of infection and allergic reaction. Will consider this for the future, as he is interested.

## 2021-08-29 PROBLEM — N50.1 BLEEDING OF PENIS: Status: ACTIVE | Noted: 2019-03-11

## 2021-08-29 NOTE — ASSESSMENT
[FreeTextEntry1] : Mr. Martinez is a 36 year old male presented with blood from tip of penis after sex.\par 3/11/19: Noted that he had sex with his wife one week ago. When he went into the shower after sex, he saw blood come out of his penis. Denied hematospermia and gross hematuria. Denied pain when he sits. Was given an antibiotic by his PCP just in case he had an infection. Noted that this was the only episode where he had blood from his penis after sex. He has had sex since that episode without any issues. Patient denied dysuria, pushing or straining, urgency, or incontinence. Doesn't wake up during the night to urinate. Denied personal history of kidney stones, tobacco use, asthma and diabetes. Denied family history of kidney stones and prostate cancer. Had a hydrocele when he was a child. Had an appendicitis in July 2018. NKDA. . , has two kids and trying to have another.\par \par 4/4/19: The patient returned today and reported that the bleeding from the tip of his penis after sex resolved. Hasn't had any pain or urinary issues. His insurance denied the request for a MRI pelvis. I recommended a transrectal US of prostate, but the patient doesn't feel it is necessary, as his symptoms have resolved. We will revisit the idea in three months. To further his care, I recommended a JANNET and the patient was discomforted by the thought of it and declined. PSA from 3/11/19 was 0.43 ng/mL. Creatinine from 3/11/19 was 0.95 mg/dL. Notes that the patient is pregnant with his wife. Says this will be his last child and was curious about a vasectomy. I informed the patient that he should view the procedure as irreversible, as there is a 3 in 10,000 chance of procedure being reversible. I counseled him about the risks and benefits of a vasectomy. I informed him about the risk of infection and allergic reaction. Will consider this for the future, as he is interested.\par \par 08/27/2021: This visit was provided via the telephone using real-time audio technology. Dr Paz was located at his office on [facility]. The patient was located at home. \par \par The patient presents today for evaluation for a vasectomy. The patient has 3 children and offered no questions regarding the procedure. I have previously consulted him regarding the vasectomy. He reports feeling well and offers no urinary complaints. He reports a good libido and denies any erectile dysfunction. \par \par  I advised him that he will need to ice the area after the procedure. He denies taking any ibuprofen or aspirin. I advised the patient that the procedure is effective in 3 in 21560 patients. It is a very safe procedure. I advised him that he will continue to use protection until he has two negative sperm tests. I am referring him to Dr. Melo for the procedure.\par \par I prescribed Valium 10 mg for the patient one tablet, an hour before the procedure. I advised him that if he takes the Valium, he will need someone else to drive him to the appointment. \par \par \par I sent Dr. Melo an email to ask him to see the patient consideration of a vasectomy.\par \par Preparation, in-person office visit, and coordination of care took: 40 minutes.

## 2021-08-29 NOTE — HISTORY OF PRESENT ILLNESS
[Other Location: e.g. School (Enter Location, City,State)___] : at [unfilled], at the time of the visit. [FreeTextEntry1] : Mr. Martinez is a 34 year old male presented with blood from tip of penis after sex.\par 3/11/19: Noted that he had sex with his wife one week ago. When he went into the shower after sex, he saw blood come out of his penis. Denied hematospermia and gross hematuria. Denied pain when he sits. Was given an antibiotic by his PCP just in case he had an infection. Noted that this was the only episode where he had blood from his penis after sex. He has had sex since that episode without any issues. Patient denied dysuria, pushing or straining, urgency, or incontinence. Doesn't wake up during the night to urinate. Denied personal history of kidney stones, tobacco use, asthma and diabetes. Denied family history of kidney stones and prostate cancer. Had a hydrocele when he was a child. Had an appendicitis in July 2018. NKDA. . , has two kids and trying to have another.\par \par 4/4/19: The patient returned today and reported that the bleeding from the tip of his penis after sex resolved. Hasn't had any pain or urinary issues. His insurance denied the request for a MRI pelvis. I recommended a transrectal US of prostate, but the patient doesn't feel it is necessary, as his symptoms have resolved. We will revisit the idea in three months. To further his care, I recommended a JANNET and the patient was discomforted by the thought of it and declined. PSA from 3/11/19 was 0.43 ng/mL. Creatinine from 3/11/19 was 0.95 mg/dL. Notes that the patient is pregnant with his wife. Says this will be his last child and was curious about a vasectomy. I informed the patient that he should view the procedure as irreversible, as there is a 3 in 10,000 chance of procedure being reversible. I counseled him about the risks and benefits of a vasectomy. I informed him about the risk of infection and allergic reaction. Will consider this for the future, as he is interested.\par \par

## 2021-09-13 ENCOUNTER — APPOINTMENT (OUTPATIENT)
Dept: UROLOGY | Facility: CLINIC | Age: 36
End: 2021-09-13

## 2021-10-05 ENCOUNTER — APPOINTMENT (OUTPATIENT)
Dept: UROLOGY | Facility: CLINIC | Age: 36
End: 2021-10-05

## 2021-10-26 ENCOUNTER — APPOINTMENT (OUTPATIENT)
Dept: UROLOGY | Facility: CLINIC | Age: 36
End: 2021-10-26
Payer: COMMERCIAL

## 2021-10-26 VITALS
BODY MASS INDEX: 21.52 KG/M2 | HEIGHT: 68 IN | OXYGEN SATURATION: 99 % | SYSTOLIC BLOOD PRESSURE: 110 MMHG | DIASTOLIC BLOOD PRESSURE: 74 MMHG | HEART RATE: 67 BPM | RESPIRATION RATE: 16 BRPM | WEIGHT: 142 LBS

## 2021-10-26 PROCEDURE — 99213 OFFICE O/P EST LOW 20 MIN: CPT

## 2021-10-26 RX ORDER — DIAZEPAM 10 MG/1
10 TABLET ORAL
Qty: 1 | Refills: 0 | Status: COMPLETED | COMMUNITY
Start: 2021-08-27 | End: 2021-10-26

## 2021-10-26 NOTE — ASSESSMENT
[FreeTextEntry1] : 36 year year old male patient presents for vasectomy consult. \par \par Patient stated he has three healthy children. Patient understands that vasectomy is form of sterilization and should be considered permanent procedure. Reversal of vasectomy possibility was discussed with the patient.\par \par Different forms of contraception were discussed with the patient.\par \par Patient chose to proceed with vasectomy.\par \par He understands the risks benefits complications and alternatives to the procedure including not performing the procedure.\par \par Specifically understands risk of bleeding, infection, failure, need to perform the procedure in the operating room, late recanalization, chronic testicular pain. Need for reversal of vasectomy in rare cases was discussed with the patient. Prescription for Valium and Celebrex were given to the patient. He will schedule the procedure. Consent was signed and witnessed. All the questions were answered.\par \par Total time of visit 45 min, greater than 50% of that time was spent in counseling patient about the problems listed in plan and coordinating of care. Specifically causes, evaluation, treatment options, risks, complications, and benefits of available therapies were discussed. Questions were answered.\par  \par \par I saw and evaluated the patient with nurse practitioner Surendra Guadalupe. \par I have confirmed the chief complaint, past medical, surgical, and social history.\par I have examined the patient. \par I have reviewed the note and interpreted auxiliary tests results. \par I have formulated the assessment and plan and discussed my recommendations of care to the nurse practitioner.\par I agree with the findings and the plan of care as documented by this  note which I edited as necessary.\par

## 2021-10-26 NOTE — HISTORY OF PRESENT ILLNESS
[FreeTextEntry1] : LEIGHANN MONTERO, a 36 year old male, presented to the office for a vasectomy consult.\par \par

## 2021-11-09 NOTE — ED PROVIDER NOTE - TEMPLATE, MLM
Neurology Hospital Progress Note    Patient ID:  Nadir Robles  [id number]  53 y.o.  1967      Subjective:   History:  Nadir Robles is a 53 y.o. female who  has a past medical history of Anemia, Arm vein blood clot, left, History of colon polyps, Lupus ([facility] [address] Neutropenia ([facility] [address] and Pulmonary hypertension ([facility] [address] She also has no past medical history of Difficult intubation, Malignant hyperthermia due to anesthesia, Nausea & vomiting, or Pseudocholinesterase deficiency. who is visiting her sister from [place] and has history of Lupus comes in with sudden persistent weakness of the R UE and R LE. Patient feels better but still weak on the R side. No new event. ROS:  Per HPI-  Otherwise the remainder of the review of system was negative      Social Hx:  Social History     Socioeconomic History    Marital status: SINGLE   Tobacco Use    Smoking status: Never Smoker    Smokeless tobacco: Never Used   Substance and Sexual Activity    Alcohol use: Never    Drug use: Never       Meds:  No current facility-administered medications on file prior to encounter. Current Outpatient Medications on File Prior to Encounter   Medication Sig Dispense Refill    traZODone (DESYREL) 150 mg tablet Take 300 mg by mouth nightly.  linaCLOtide (Linzess) 72 mcg cap capsule Take 72 mcg by mouth Daily (before breakfast).  predniSONE (DELTASONE) 10 mg tablet Take 10 mg by mouth daily (with breakfast).  omeprazole (PRILOSEC) 40 mg capsule Take 40 mg by mouth daily.  magnesium oxide (MAG-OX) 400 mg tablet Take 400 mg by mouth nightly.  traMADoL (ULTRAM) 50 mg tablet Take 50 mg by mouth every six (6) hours as needed for Pain.  pregabalin (LYRICA) 150 mg capsule Take 150 mg by mouth two (2) times a day. Indications: pain      famotidine (PEPCID) 40 mg tablet Take 40 mg by mouth daily.  Indications: gastroesophageal reflux disease      atorvastatin (LIPITOR) 10 mg tablet Take 10 mg by mouth daily.      potassium chloride (Klor-Con M20) 20 mEq tablet Take 20 mEq by mouth daily.  furosemide (Lasix) 40 mg tablet Take 40 mg by mouth daily. Imaging:    CT Results (recent):  Results from Hospital Encounter encounter on 11/07/21    CTA HEAD NECK W CONT    Narrative  *PRELIMINARY REPORT*    No large vessel occlusion in the head or neck. Preliminary report was provided by Dr. Wilton Serna, the on-call radiologist, on  11/7/2021 at 1335 hours. Final report to follow. *END PRELIMINARY REPORT*    CLINICAL HISTORY: Right leg weakness    EXAMINATION:  CT ANGIOGRAPHY HEAD AND NECK    COMPARISON: None    TECHNIQUE:  Following the uneventful administration of iodinated contrast  material, axial CT angiography of the head and neck was performed. Delayed axial  images through the head were also obtained. Coronal and sagittal reconstructions  were obtained. Manual postprocessing of images was performed. 3-D  Sagittal  maximal intensity projection images were obtained. 3-D Coronal maximal  intensity projections were obtained. CT dose reduction was achieved through use  of a standardized protocol tailored for this examination and automatic exposure  control for dose modulation. This study was analyzed by the [address] ai algorithm. FINDINGS:    Delayed contrast-enhanced head CT:    The ventricles are midline without hydrocephalus. There is no acute intra or  extra-axial hemorrhage. The basal cisterns are clear. The paranasal sinuses are  clear. CTA NECK:    Great vessels: Normal arch anatomy with the origins patent. Right subclavian artery: Patent    Left subclavian artery: Patent    Right common carotid artery: Patent    Left common carotid artery: Patent    Cervical right internal carotid artery: Patent with no significant stenosis by  NASCET criteria. Cervical left internal carotid artery: Patent with no significant stenosis by  NASCET criteria.     Right vertebral artery: Patent    Left vertebral artery: Patent    Imaged lung apices are clear. Bilateral axillary and prepectoral  lymphadenopathy. No cervical lymphadenopathy. Numerous calcifications throughout  both parotid glands. 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 Anterior cerebral arteries: Hypoplastic left A1 segment. Patent right A1 segment  and bilateral A2 segments. Anterior communicating artery: Patent    Right middle cerebral artery: Patent    Left middle cerebral artery: Patent    Posterior communicating arteries: Patent    Posterior cerebral arteries: Patent    Basilar artery: Patent    Distal vertebral arteries: Patent    Impression  1. No intracranial large vessel occlusion or hemodynamically significant  stenosis. 2.  Bilateral axillary and prepectoral lymphadenopathy. 3.  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 MRI Results (recent):  Results from [place] encounter on 11/07/21    MRI BRAIN WO CONT    Narrative  EXAM:  MRI BRAIN WO CONT  INDICATION:  r/o stroke, acute onset right-sided weakness. History of lupus. TECHNIQUE:  Sagittal T1, axial FLAIR, T2,T1 and gradient echo images as well as coronal T2  weighted images and axial diffusion weighted images of the head were obtained. COMPARISON:  CT, CTA  FINDINGS:  The ventricular size and configuration are normal.  Small area of T2 hyperintensity and restricted diffusion extending in the left  posterior internal capsule into the left posterior corona radiata. Findings  consistent with acute lacunar type infarction. No other abnormal restricted diffusion or other findings of acute infarction. Other small foci of T2 hyperintensity in the cerebral white matter without  associated restricted diffusion possibly related to mild chronic small vessel  ischemic change.   No evidence of hemorrhage, mass or abnormal extra-axial fluid collections. Flow voids are present in the vertebral basilar and carotid artery systems. The craniocervical junction is unremarkable. The structures at the cranial base including paranasal sinuses are unremarkable. Impression  Acute small left posterior internal capsule/corona radiata lacunar infarct. IR Results (recent):  No results found for this or any previous visit. VAS/US Results (recent):  No results found for this or any previous visit. Reviewed records in [facility] tab today    Lab Review     Admission on 11/07/2021   Component Date Value Ref Range Status    WBC 11/07/2021 3.3* 3.6 - 11.0 K/uL Final    RBC 11/07/2021 4.19  3.80 - 5.20 M/uL Final    HGB 11/07/2021 12.7  11.5 - 16.0 g/dL Final    HCT 11/07/2021 39.2  35.0 - 47.0 % Final    MCV 11/07/2021 93.6  80.0 - 99.0 FL Final    MCH 11/07/2021 30.3  26.0 - 34.0 PG Final    MCHC 11/07/2021 32.4  30.0 - 36.5 g/dL Final    RDW 11/07/2021 12.6  11.5 - 14.5 % Final    PLATELET 01/11/2163 260  150 - 400 K/uL Final    MPV 11/07/2021 10.8  8.9 - 12.9 FL Final    NRBC 11/07/2021 0.0  0.0  WBC Final    ABSOLUTE NRBC 11/07/2021 0.00  0.00 - 0.01 K/uL Final    NEUTROPHILS 11/07/2021 46  32 - 75 % Final    LYMPHOCYTES 11/07/2021 43  12 - 49 % Final    MONOCYTES 11/07/2021 10  5 - 13 % Final    EOSINOPHILS 11/07/2021 0  0 - 7 % Final    BASOPHILS 11/07/2021 0  0 - 1 % Final    IMMATURE GRANULOCYTES 11/07/2021 0  0 - 0.5 % Final    ABS. NEUTROPHILS 11/07/2021 1.5* 1.8 - 8.0 K/UL Final    ABS. LYMPHOCYTES 11/07/2021 1.4  0.8 - 3.5 K/UL Final    ABS. MONOCYTES 11/07/2021 0.3  0.0 - 1.0 K/UL Final    ABS. EOSINOPHILS 11/07/2021 0.0  0.0 - 0.4 K/UL Final    ABS. BASOPHILS 11/07/2021 0.0  0.0 - 0.1 K/UL Final    ABS. IMM. GRANS.  11/07/2021 0.0  0.00 - 0.04 K/UL Final    DF 11/07/2021 AUTOMATED    Final    Sodium 11/07/2021 140  136 - 145 mmol/L Final    Potassium 11/07/2021 3.6  3.5 - 5.1 mmol/L Final General  Chloride 11/07/2021 103  97 - 108 mmol/L Final    CO2 11/07/2021 29  21 - 32 mmol/L Final    Anion gap 11/07/2021 8  5 - 15 mmol/L Final    Glucose 11/07/2021 101* 65 - 100 mg/dL Final    BUN 11/07/2021 14  6 - 20 MG/DL Final    Creatinine 11/07/2021 0.84  0.55 - 1.02 MG/DL Final    BUN/Creatinine ratio 11/07/2021 17  12 - 20   Final    GFR est AA 11/07/2021 >60  >60 ml/min/1.73m2 Final    GFR est non-AA 11/07/2021 >60  >60 ml/min/1.73m2 Final    Estimated GFR is calculated using the IDMS-traceable Modification of Diet in Renal Disease (MDRD) Study equation, reported for both  Americans (GFRAA) and non- Americans (GFRNA), and normalized to 1.73m2 body surface area. The physician must decide which value applies to the patient.  Calcium 11/07/2021 8.6  8.5 - 10.1 MG/DL Final    Bilirubin, total 11/07/2021 0.3  0.2 - 1.0 MG/DL Final    ALT (SGPT) 11/07/2021 20  12 - 78 U/L Final    AST (SGOT) 11/07/2021 21  15 - 37 U/L Final    Alk. phosphatase 11/07/2021 66  45 - 117 U/L Final    Protein, total 11/07/2021 9.1* 6.4 - 8.2 g/dL Final    Albumin 11/07/2021 3.0* 3.5 - 5.0 g/dL Final    Globulin 11/07/2021 6.1* 2.0 - 4.0 g/dL Final    A-G Ratio 11/07/2021 0.5* 1.1 - 2.2   Final    Troponin-High Sensitivity 11/07/2021 6  0 - 51 ng/L Final    Up to 50% of normal patients may have low levels of detectable troponin (within reference range) circulating in the blood. Mild elevations in troponin that are above the reference range, may be present with other cardiac and non-cardiac disease states. Two or three serial tests at two hour intervals, are recommended to evaluate changes in circulating troponin over time.     Color 11/07/2021 YELLOW/STRAW    Final    Color Reference Range: Straw, Yellow or Dark Yellow    Appearance 11/07/2021 CLEAR  CLEAR   Final    Specific gravity 11/07/2021 1.010  1.003 - 1.030   Final    pH (UA) 11/07/2021 7.0  5.0 - 8.0   Final    Protein 11/07/2021 Negative NEG mg/dL Final    Glucose 11/07/2021 Negative  NEG mg/dL Final    Ketone 11/07/2021 Negative  NEG mg/dL Final    Bilirubin 11/07/2021 Negative  NEG   Final    Blood 11/07/2021 Negative  NEG   Final    Urobilinogen 11/07/2021 0.2  0.2 - 1.0 EU/dL Final    Nitrites 11/07/2021 Negative  NEG   Final    Leukocyte Esterase 11/07/2021 Negative  NEG   Final    WBC 11/07/2021 0-4  0 - 4 /hpf Final    RBC 11/07/2021 0-5  0 - 5 /hpf Final    Epithelial cells 11/07/2021 FEW  FEW /lpf Final    Epithelial cell category consists of squamous cells and /or transitional urothelial cells. Renal tubular cells, if present, are separately identified as such.  Bacteria 11/07/2021 Negative  NEG /hpf Final    Urine culture hold 11/07/2021 Urine on hold in Microbiology dept for 2 days. If unpreserved urine is submitted, it cannot be used for addtional testing after 24 hours, recollection will be required. Final    Ventricular Rate 11/07/2021 58  BPM Final    Atrial Rate 11/07/2021 58  BPM Final    P-R Interval 11/07/2021 190  ms Final    QRS Duration 11/07/2021 98  ms Final    Q-T Interval 11/07/2021 444  ms Final    QTC Calculation (Bezet) 11/07/2021 435  ms Final    Calculated P Axis 11/07/2021 40  degrees Final    Calculated R Axis 11/07/2021 -34  degrees Final    Calculated T Axis 11/07/2021 44  degrees Final    Diagnosis 11/07/2021    Final                    Value:Sinus bradycardia  Possible Left atrial enlargement  Left axis deviation  Left ventricular hypertrophy ( R in aVL , Barnard product )  When compared with ECG of 18-APR-2021 16:21,  Vent.  rate has decreased BY  31 BPM  QT has shortened  Confirmed by Hoda HARGROVE, Baldo Monroy ([id number]) on 11/8/2021 5:12:42 PM      Cholesterol, total 11/08/2021 172  <200 MG/DL Final    Triglyceride 11/08/2021 135  <150 MG/DL Final    Based on NCEP-ATP III:  Triglycerides <150 mg/dL  is considered normal, 150-199 mg/dL  borderline high,  200-499 mg/dL high and  greater than or equal to 500 mg/dL very high.  HDL Cholesterol 11/08/2021 61  MG/DL Final    Based on NCEP ATP III, HDL Cholesterol <40 mg/dL is considered low and >60 mg/dL is elevated.  LDL, calculated 11/08/2021 84  0 - 100 MG/DL Final    Comment: Based on the NCEP-ATP: LDL-C concentrations are considered  optimal <100 mg/dL,  near optimal/above Normal 100-129 mg/dL  Borderline High: 130-159, High: 160-189 mg/dL  Very High: Greater than or equal to 190 mg/dL      VLDL, calculated 11/08/2021 27  MG/DL Final    CHOL/HDL Ratio 11/08/2021 2.8  0.0 - 5.0   Final    Hemoglobin A1c 11/08/2021 5.6  4.0 - 5.6 % Final    Comment: NEW METHOD  PLEASE NOTE NEW REFERENCE RANGE  (NOTE)  HbA1C Interpretive Ranges  <5.7              Normal  5.7 - 6.4         Consider Prediabetes  >6.5              Consider Diabetes      Est. average glucose 11/08/2021 114  mg/dL Final    Sodium 11/08/2021 136  136 - 145 mmol/L Final    Potassium 11/08/2021 2.8* 3.5 - 5.1 mmol/L Final    INVESTIGATED PER DELTA CHECK PROTOCOL    Chloride 11/08/2021 101  97 - 108 mmol/L Final    CO2 11/08/2021 29  21 - 32 mmol/L Final    Anion gap 11/08/2021 6  5 - 15 mmol/L Final    Glucose 11/08/2021 111* 65 - 100 mg/dL Final    BUN 11/08/2021 10  6 - 20 MG/DL Final    Creatinine 11/08/2021 0.69  0.55 - 1.02 MG/DL Final    BUN/Creatinine ratio 11/08/2021 14  12 - 20   Final    GFR est AA 11/08/2021 >60  >60 ml/min/1.73m2 Final    GFR est non-AA 11/08/2021 >60  >60 ml/min/1.73m2 Final    Calcium 11/08/2021 8.7  8.5 - 10.1 MG/DL Final    Bilirubin, total 11/08/2021 0.6  0.2 - 1.0 MG/DL Final    ALT (SGPT) 11/08/2021 11* 12 - 78 U/L Final    AST (SGOT) 11/08/2021 24  15 - 37 U/L Final    Alk.  phosphatase 11/08/2021 73  45 - 117 U/L Final    Protein, total 11/08/2021 9.0* 6.4 - 8.2 g/dL Final    Albumin 11/08/2021 3.0* 3.5 - 5.0 g/dL Final    Globulin 11/08/2021 6.0* 2.0 - 4.0 g/dL Final    A-G Ratio 11/08/2021 0.5* 1.1 - 2.2   Final    Magnesium 11/08/2021 1.6  1.6 - 2.4 mg/dL Final    WBC 11/08/2021 3.4* 3.6 - 11.0 K/uL Final    RBC 11/08/2021 4.49  3.80 - 5.20 M/uL Final    HGB 11/08/2021 13.5  11.5 - 16.0 g/dL Final    HCT 11/08/2021 41.4  35.0 - 47.0 % Final    MCV 11/08/2021 92.2  80.0 - 99.0 FL Final    MCH 11/08/2021 30.1  26.0 - 34.0 PG Final    MCHC 11/08/2021 32.6  30.0 - 36.5 g/dL Final    RDW 11/08/2021 12.4  11.5 - 14.5 % Final    PLATELET 84/44/1016 040  150 - 400 K/uL Final    MPV 11/08/2021 10.3  8.9 - 12.9 FL Final    NRBC 11/08/2021 0.0  0  WBC Final    ABSOLUTE NRBC 11/08/2021 0.00  0.00 - 0.01 K/uL Final         Objective:       Exam:  Visit Vitals  BP 95/67 (BP 1 Location: Left arm, BP Patient Position: At rest)   Pulse 99   Temp 98.7 °F (37.1 °C)   Resp 16   Ht 5' 7\" (1.702 m)   Wt 74.4 kg (164 lb)   SpO2 96%   BMI 25.69 kg/m²     Gen: Awake, alert, follows commands  Appropriate appearance, normal speech. Oriented to all spheres. No visual field defect on confrontation exam.  Full eyes movement, with no nystagmus, no diplopia, no ptosis. Normal gag and swallow. All remaining cranial nerves were normal  Motor: R UE 4/5  R LE 4/5  But question about effort  Sensory: intact to LT, PP, vibration, and temperature  Reflexes: 2+ throughout; Down going toes  Coordination: Good FTN and HTS  Gait: deferred    Assessment:     1. Acute right-sided weakness      Acute small left posterior internal capsule/corona radiata lacunar stroke, more likely small vessel in etiology    CTA head and neck: No intracranial large vessel occlusion or hemodynamically significant  stenosis. Bilateral axillary and prepectoral lymphadenopathy. 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 Plan:   1. I had a long discussion with patient and daughter (via phone) Discussed diagnosis, prognosis, pathophysiology and available treatment. Reviewed test results. All questions were answered.   2. Discussed MRI brain (reviewed films) showed stroke  3. ASA 81 every day for now  4. LDL 84. Lipitor increased to 80 mg every day   5. Follow up echo  6. Physical therapy/ OT/ Speech  7. Follow up with NeuroIR Dr Andrey De Jesus as out patient for aneurysm  8.  for discharge planning (possible acute rehab)     Please call for questions    35 mins of time spent, 50% of which was spent on counseling and coordination of care.       Dorita Marc MD  Diplomate, American Board of Psychiatry and Neurology  Diplomate, Neuromuscular Medicine  Diplomate, American Board of Electrodiagnostic Medicine

## 2021-11-17 NOTE — ED ADULT NURSE NOTE - NS ED PATIENT SAFETY CONCERN
Junior wife called me to update his demographic but his wife stated this is WC. They are in the process of filling out the paperwork. I sent Sandeep our financial worker a message and gave Junior wife the number to call Sandeep.      Eloisa Crandall Communication Facilitator on 11/17/2021 at 1:35 PM     No

## 2021-12-06 ENCOUNTER — APPOINTMENT (OUTPATIENT)
Dept: UROLOGY | Facility: CLINIC | Age: 36
End: 2021-12-06

## 2021-12-08 NOTE — ED ADULT NURSE NOTE - CAS DISCH CONDITION
Detail Level: Detailed Biopsy Photograph Reviewed: Yes Number Of Curettages: 2 Size Of Lesion In Cm: 0.6 Size Of Lesion After Curettage: 1.1 Add Intralesional Injection: No Concentration (Mg/Ml Or Millions Of Plaque Forming Units/Cc): 0.01 Total Volume (Ccs): 1 Anesthesia Type: 1% lidocaine without epinephrine Cautery Type: electrodesiccation What Was Performed First?: Curettage Final Size Statement: The size of the lesion after curettage was Additional Information: (Optional): The wound was cleaned, and a pressure dressing was applied.  The patient received detailed post-op instructions. Consent was obtained from the patient. The risks, benefits and alternatives to therapy were discussed in detail. Specifically, the risks of infection, scarring, bleeding, prolonged wound healing, nerve injury, incomplete removal, allergy to anesthesia and recurrence were addressed. Alternatives to ED&C, such as: surgical removal and XRT were also discussed.  Prior to the procedure, the treatment site was clearly identified and confirmed by the patient. All components of Universal Protocol/PAUSE Rule completed. Post-Care Instructions: I reviewed with the patient in detail post-care instructions. Patient is to keep the area dry for 24 hours and not to engage in any swimming until the area is healed. Bill As A Line Item Or As Units: Line Item Stable

## 2021-12-20 ENCOUNTER — APPOINTMENT (OUTPATIENT)
Dept: UROLOGY | Facility: CLINIC | Age: 36
End: 2021-12-20
Payer: COMMERCIAL

## 2021-12-20 VITALS
HEART RATE: 66 BPM | DIASTOLIC BLOOD PRESSURE: 64 MMHG | SYSTOLIC BLOOD PRESSURE: 109 MMHG | OXYGEN SATURATION: 98 % | TEMPERATURE: 97.3 F

## 2021-12-20 DIAGNOSIS — Z30.09 ENCOUNTER FOR OTHER GENERAL COUNSELING AND ADVICE ON CONTRACEPTION: ICD-10-CM

## 2021-12-20 PROCEDURE — 55250 REMOVAL OF SPERM DUCT(S): CPT

## 2021-12-20 RX ORDER — CELECOXIB 200 MG/1
200 CAPSULE ORAL
Qty: 10 | Refills: 0 | Status: COMPLETED | COMMUNITY
Start: 2021-10-26 | End: 2021-12-20

## 2021-12-20 RX ORDER — DIAZEPAM 5 MG/1
5 TABLET ORAL
Qty: 2 | Refills: 0 | Status: COMPLETED | COMMUNITY
Start: 2021-10-26 | End: 2021-12-20

## 2021-12-20 RX ORDER — LIDOCAINE AND PRILOCAINE 25; 25 MG/G; MG/G
2.5-2.5 CREAM TOPICAL
Qty: 1 | Refills: 0 | Status: COMPLETED | COMMUNITY
Start: 2021-10-26 | End: 2021-12-20

## 2022-01-31 ENCOUNTER — APPOINTMENT (OUTPATIENT)
Dept: UROLOGY | Facility: CLINIC | Age: 37
End: 2022-01-31
Payer: COMMERCIAL

## 2022-01-31 PROCEDURE — 89321 SEMEN ANAL SPERM DETECTION: CPT | Mod: QW

## 2022-01-31 NOTE — ASSESSMENT
[FreeTextEntry1] : No sperm found. \par Many WBCs but not symptomatic. \par Should bring one more semen analysis \par discussed with patient \par

## 2022-02-14 ENCOUNTER — APPOINTMENT (OUTPATIENT)
Dept: UROLOGY | Facility: CLINIC | Age: 37
End: 2022-02-14
Payer: COMMERCIAL

## 2022-02-14 DIAGNOSIS — Z98.52 VASECTOMY STATUS: ICD-10-CM

## 2022-02-14 PROCEDURE — 89321 SEMEN ANAL SPERM DETECTION: CPT | Mod: QW

## 2022-02-14 PROCEDURE — 99024 POSTOP FOLLOW-UP VISIT: CPT

## 2022-02-14 NOTE — ASSESSMENT
[FreeTextEntry1] : No sperm found \par after vasectomy \par patient aware \par \par follow up as needed

## 2022-08-15 LAB — CORE LAB BIOPSY: NORMAL

## 2023-06-07 ENCOUNTER — FORM ENCOUNTER (OUTPATIENT)
Age: 38
End: 2023-06-07

## 2023-06-08 ENCOUNTER — APPOINTMENT (OUTPATIENT)
Dept: MRI IMAGING | Facility: CLINIC | Age: 38
End: 2023-06-08
Payer: OTHER MISCELLANEOUS

## 2023-06-08 ENCOUNTER — APPOINTMENT (OUTPATIENT)
Dept: ORTHOPEDIC SURGERY | Facility: CLINIC | Age: 38
End: 2023-06-08
Payer: OTHER MISCELLANEOUS

## 2023-06-08 VITALS — HEIGHT: 68 IN | WEIGHT: 145 LBS | BODY MASS INDEX: 21.98 KG/M2

## 2023-06-08 DIAGNOSIS — S83.8X1A SPRAIN OF OTHER SPECIFIED PARTS OF RIGHT KNEE, INITIAL ENCOUNTER: ICD-10-CM

## 2023-06-08 PROCEDURE — 73721 MRI JNT OF LWR EXTRE W/O DYE: CPT | Mod: RT

## 2023-06-08 PROCEDURE — 99203 OFFICE O/P NEW LOW 30 MIN: CPT
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
## 2023-06-08 NOTE — ASSESSMENT
[FreeTextEntry1] : 06/08/23: Patient states ER x-rays unremarkable.\par Pathology and treatment options reviewed.\par MRI right knee to eval HNP.\par Ice, rest, NSAIDs recommended.\par Return with MRI results.\par \par Progress note completed by Ana Styles PA-C under the direct supervision of Michael Souza MD.\par

## 2023-06-08 NOTE — PHYSICAL EXAM
[Right] : right knee [NL (0)] : extension 0 degrees [5___] : hamstring 5[unfilled]/5 [] : no ecchymosis [TWNoteComboBox7] : flexion 130 degrees

## 2023-06-08 NOTE — WORK
[Was the competent medical cause of the injury] : was the competent medical cause of the injury [Sprain/Strain] : sprain/strain [Are consistent with the injury] : are consistent with the injury [Consistent with my objective findings] : consistent with my objective findings [Total (100%)] : total (100%) [Does not reveal pre-existing condition(s) that may affect treatment/prognosis] : does not reveal pre-existing condition(s) that may affect treatment/prognosis [Cannot return to work because ________] : cannot return to work because [unfilled] [15+ days] : 15+ days [Patient] : patient [FreeTextEntry1] : guarded

## 2023-07-13 ENCOUNTER — NON-APPOINTMENT (OUTPATIENT)
Age: 38
End: 2023-07-13

## 2023-07-13 ENCOUNTER — APPOINTMENT (OUTPATIENT)
Dept: ORTHOPEDIC SURGERY | Facility: CLINIC | Age: 38
End: 2023-07-13
Payer: OTHER MISCELLANEOUS

## 2023-07-13 VITALS — BODY MASS INDEX: 21.98 KG/M2 | HEIGHT: 68 IN | WEIGHT: 145 LBS

## 2023-07-13 DIAGNOSIS — T14.8XXA OTHER INJURY OF UNSPECIFIED BODY REGION, INITIAL ENCOUNTER: ICD-10-CM

## 2023-07-13 DIAGNOSIS — M23.91 UNSPECIFIED INTERNAL DERANGEMENT OF RIGHT KNEE: ICD-10-CM

## 2023-07-13 PROCEDURE — 99214 OFFICE O/P EST MOD 30 MIN: CPT

## 2023-07-13 NOTE — ASSESSMENT
[FreeTextEntry1] : 06/08/23: Patient states ER x-rays unremarkable.\par Pathology and treatment options reviewed.\par MRI right knee to eval HNP.\par Ice, rest, NSAIDs recommended.\par Return with MRI results.\par \par 07/13/2023: MRI reviewed and discussed. \par MRI reports suggest grade 2 signal of MMT, but one one slide breaches the articular surface. \par He may require an MRI with contrast in the future to verify MMT if symptoms persist. \par Appreciable subchondral edema of the medial tibial plateau noted. \par He is limited duty at this time. \par He does not feel he is ready to return full duty. \par Start PT and HEP to improve mechanics and reduce pain.\par OOW 1 month. \par \par Paperwork completed. RTO 4-6 wks. \par Questions addressed. Activity modifier as tolerated.\par \par The documentation recorded by the scribe accurately reflects the service I personally performed and the decisions made by me.\par I, Aaron Traore, attest that this documentation has been prepared under the direction and in the presence of Provider Michael Souza MD.\par \par The patient was seen by Michael Souza MD.\par The following radiographs were ordered and read by me during this patient's visit. I reviewed each radiograph in detail with the patient, and discussed the findings as highlighted below.\par

## 2023-07-13 NOTE — DATA REVIEWED
[MRI] : MRI [Right] : of the right [Knee] : knee [Report was reviewed and noted in the chart] : The report was reviewed and noted in the chart [I independently reviewed and interpreted images and report] : I independently reviewed and interpreted images and report [FreeTextEntry1] : [facility] MRI Right knee 6/8/2023\par Impression: \par 1. Grade 2 signal in the peripheral junction of the posterior horn and body of the medial meniscus where there is also adjacent subchondral edema measuring 5-6 mm in the peripheral posteeior medial tibial plateau suggesting focal stress reaction without chondral defect or loose body. The possibility of occult peripheral medial meniscal tear should be considered given the area of stress reaction in the adjacent medial tibial plateau but there is no MRI evidence of medial meniscal tear using MRI criteria. Consider repeat MRI to further evaluate for healing as clinically indicated.\par 2. Slight patellofemoral effusion and synovitis.\par 3. No ligament tear or loose body.\par Electronically signed by Jerrod Brown MD 06/09/2023

## 2023-07-13 NOTE — PHYSICAL EXAM
[Right] : right knee [NL (0)] : extension 0 degrees [5___] : hamstring 5[unfilled]/5 [] : medial tibial plateau tenderness [FreeTextEntry8] : guarding  [TWNoteComboBox7] : flexion 130 degrees
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
## 2023-07-13 NOTE — WORK
[Sprain/Strain] : sprain/strain [Was the competent medical cause of the injury] : was the competent medical cause of the injury [Are consistent with the injury] : are consistent with the injury [Consistent with my objective findings] : consistent with my objective findings [Total (100%)] : total (100%) [Does not reveal pre-existing condition(s) that may affect treatment/prognosis] : does not reveal pre-existing condition(s) that may affect treatment/prognosis [Cannot return to work because ________] : cannot return to work because [unfilled] [15+ days] : 15+ days [Patient] : patient [FreeTextEntry1] : guarded

## 2023-08-31 ENCOUNTER — APPOINTMENT (OUTPATIENT)
Dept: ORTHOPEDIC SURGERY | Facility: CLINIC | Age: 38
End: 2023-08-31

## 2023-09-07 NOTE — LETTER BODY
Consult Letter:] : I had the pleasure of evaluating your patient, [unfilled]. [Please see my note below.] : Please see my note below. [Consult Closing:] : Thank you very much for allowing me to participate in the care of this patient.  If you have any questions, please do not hesitate to contact me. [Sincerely,] : Sincerely, [Dear  ___] : Dear  [unfilled], [( Thank you for referring [unfilled] for consultation for _____ )] : Thank you for referring [unfilled] for consultation for [unfilled] [FreeTextEntry2] : Luz Segura M.D.\par [address], [address]\[place] [address] [FreeTextEntry1] : Mr. Martinez is a 34 year old male presenting with blood from tip of penis after sex. Notes that he had sex with his wife one week ago. When he went into the shower after sex, he saw blood come out of his penis. Denies hematospermia and gross hematuria. Denies pain when he sits. Was given an antibiotic by his PCP just in case he had an infection. Notes that this was the only episode where he had blood from his penis after sex. He has had sex since that episode without any issues. Patient denies dysuria, pushing or straining, urgency, or incontinence. Doesn't wake up during the night to urinate. Denies personal history of kidney stones, tobacco use, asthma and diabetes. Denies family history of kidney stones and prostate cancer. Had a hydrocele when he was a child. Had an appendicitis in July 2018. NKDA. . , has two kids and trying to have another.\par \par The patient produced a urine sample which will be sent for urinalysis, urine cytology, and urine culture.\par Blood work today included comprehensive metabolic panel, CBC, PSA, and testosterone.\par \par The patient will undergo a MRI pelvis.\par \par The patient will return one week after the MRI for a follow up.  [FreeTextEntry3] : Troy Paz M.D. PhD